# Patient Record
Sex: FEMALE | Race: WHITE | NOT HISPANIC OR LATINO
[De-identification: names, ages, dates, MRNs, and addresses within clinical notes are randomized per-mention and may not be internally consistent; named-entity substitution may affect disease eponyms.]

---

## 2017-06-26 PROBLEM — Z00.00 ENCOUNTER FOR PREVENTIVE HEALTH EXAMINATION: Status: ACTIVE | Noted: 2017-06-26

## 2018-01-09 ENCOUNTER — TRANSCRIPTION ENCOUNTER (OUTPATIENT)
Age: 63
End: 2018-01-09

## 2018-01-16 ENCOUNTER — APPOINTMENT (OUTPATIENT)
Dept: PLASTIC SURGERY | Facility: CLINIC | Age: 63
End: 2018-01-16
Payer: COMMERCIAL

## 2018-01-16 VITALS — HEIGHT: 69 IN | BODY MASS INDEX: 26.66 KG/M2 | WEIGHT: 180 LBS

## 2018-01-16 DIAGNOSIS — Z78.9 OTHER SPECIFIED HEALTH STATUS: ICD-10-CM

## 2018-01-16 DIAGNOSIS — Z72.0 TOBACCO USE: ICD-10-CM

## 2018-01-16 DIAGNOSIS — Z85.850 PERSONAL HISTORY OF MALIGNANT NEOPLASM OF THYROID: ICD-10-CM

## 2018-01-16 PROCEDURE — 99203 OFFICE O/P NEW LOW 30 MIN: CPT

## 2018-01-18 ENCOUNTER — OUTPATIENT (OUTPATIENT)
Dept: OUTPATIENT SERVICES | Facility: HOSPITAL | Age: 63
LOS: 1 days | Discharge: HOME | End: 2018-01-18

## 2018-01-18 DIAGNOSIS — Z01.818 ENCOUNTER FOR OTHER PREPROCEDURAL EXAMINATION: ICD-10-CM

## 2018-01-18 DIAGNOSIS — L72.3 SEBACEOUS CYST: ICD-10-CM

## 2018-01-22 ENCOUNTER — APPOINTMENT (OUTPATIENT)
Dept: PLASTIC SURGERY | Facility: AMBULATORY SURGERY CENTER | Age: 63
End: 2018-01-22
Payer: COMMERCIAL

## 2018-01-22 ENCOUNTER — OUTPATIENT (OUTPATIENT)
Dept: OUTPATIENT SERVICES | Facility: HOSPITAL | Age: 63
LOS: 1 days | Discharge: HOME | End: 2018-01-22

## 2018-01-22 PROCEDURE — 14040 TIS TRNFR F/C/C/M/N/A/G/H/F: CPT

## 2018-01-24 DIAGNOSIS — L72.3 SEBACEOUS CYST: ICD-10-CM

## 2018-01-30 ENCOUNTER — APPOINTMENT (OUTPATIENT)
Dept: PLASTIC SURGERY | Facility: CLINIC | Age: 63
End: 2018-01-30
Payer: COMMERCIAL

## 2018-01-30 PROCEDURE — 99024 POSTOP FOLLOW-UP VISIT: CPT

## 2018-01-30 RX ORDER — LEVOTHYROXINE SODIUM 125 UG/1
125 TABLET ORAL
Qty: 90 | Refills: 0 | Status: ACTIVE | COMMUNITY
Start: 2018-01-20

## 2018-04-29 ENCOUNTER — EMERGENCY (EMERGENCY)
Facility: HOSPITAL | Age: 63
LOS: 0 days | Discharge: HOME | End: 2018-04-29
Attending: EMERGENCY MEDICINE | Admitting: EMERGENCY MEDICINE

## 2018-04-29 VITALS
RESPIRATION RATE: 18 BRPM | DIASTOLIC BLOOD PRESSURE: 69 MMHG | TEMPERATURE: 99 F | OXYGEN SATURATION: 95 % | SYSTOLIC BLOOD PRESSURE: 154 MMHG | HEART RATE: 86 BPM

## 2018-04-29 DIAGNOSIS — S52.92XA UNSPECIFIED FRACTURE OF LEFT FOREARM, INITIAL ENCOUNTER FOR CLOSED FRACTURE: ICD-10-CM

## 2018-04-29 DIAGNOSIS — S01.81XA LACERATION WITHOUT FOREIGN BODY OF OTHER PART OF HEAD, INITIAL ENCOUNTER: ICD-10-CM
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
## 2018-04-29 DIAGNOSIS — S09.90XA UNSPECIFIED INJURY OF HEAD, INITIAL ENCOUNTER: ICD-10-CM
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
## 2018-04-29 DIAGNOSIS — Z23 ENCOUNTER FOR IMMUNIZATION: ICD-10-CM

## 2018-04-29 DIAGNOSIS — E78.5 HYPERLIPIDEMIA, UNSPECIFIED: ICD-10-CM

## 2018-04-29 DIAGNOSIS — M25.532 PAIN IN LEFT WRIST: ICD-10-CM
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
## 2018-04-29 RX ORDER — TETANUS TOXOID, REDUCED DIPHTHERIA TOXOID AND ACELLULAR PERTUSSIS VACCINE, ADSORBED 5; 2.5; 8; 8; 2.5 [IU]/.5ML; [IU]/.5ML; UG/.5ML; UG/.5ML; UG/.5ML
0.5 SUSPENSION INTRAMUSCULAR ONCE
Qty: 0 | Refills: 0 | Status: COMPLETED | OUTPATIENT
Start: 2018-04-29 | End: 2018-04-29

## 2018-04-29 RX ORDER — ACETAMINOPHEN 500 MG
650 TABLET ORAL ONCE
Qty: 0 | Refills: 0 | Status: COMPLETED | OUTPATIENT
Start: 2018-04-29 | End: 2018-04-29

## 2018-04-29 RX ADMIN — Medication 650 MILLIGRAM(S): at 09:44

## 2018-04-29 RX ADMIN — TETANUS TOXOID, REDUCED DIPHTHERIA TOXOID AND ACELLULAR PERTUSSIS VACCINE, ADSORBED 0.5 MILLILITER(S): 5; 2.5; 8; 8; 2.5 SUSPENSION INTRAMUSCULAR at 10:44

## 2018-04-29 NOTE — ED PROVIDER NOTE - CARE PLAN
Principal Discharge DX:	Closed head injury  Secondary Diagnosis:	Forehead laceration  Secondary Diagnosis:	Radius fracture

## 2018-04-29 NOTE — ED PROVIDER NOTE - OBJECTIVE STATEMENT
63 y/o F, PMHx Hypothyroidism, presents to the ED s/p mechanical fall at 2000 last night. Patient states that she tripped and fell in bathroom - sustaining a laceration to her forehead and pain/swelling to her left wrist/forearm. She denies any LOC and was able to stand up on her own following fall. She has been ambulating without difficulty. She denies any numbness/tingling to upper extremity, back pain, neck pain, chest pain, dyspnea, abdominal pain, hip pain, nausea, vomiting and additional injuries. She denies any blood thinner use.

## 2018-04-29 NOTE — ED PROCEDURE NOTE - CPROC ED POST PROC CARE GUIDE1
Instructed patient/caregiver regarding signs and symptoms of infection./Keep the cast/splint/dressing clean and dry./Instructed patient/caregiver to follow-up with primary care physician./Verbal/written post procedure instructions were given to patient/caregiver.
Verbal/written post procedure instructions were given to patient/caregiver./Elevate the injured extremity as instructed./Keep the cast/splint/dressing clean and dry./Instructed patient/caregiver to follow-up with primary care physician./Instructed patient/caregiver regarding signs and symptoms of infection.

## 2018-04-29 NOTE — ED PROVIDER NOTE - MUSCULOSKELETAL, MLM
+ swelling and tenderness along left wrist and forearm with full ROM exhibited to shoulders/elbows/hips/knees l Spine appears normal

## 2018-04-29 NOTE — ED ADULT NURSE NOTE - OBJECTIVE STATEMENT
Pt states she fell this AM in bathroom, slipped and fell forward, head lac to forehead from hitting into edge of counter and left wrist swelling and tenderness, denies LOC lv nausea/ vomitting.

## 2018-04-29 NOTE — ED PROVIDER NOTE - ATTENDING CONTRIBUTION TO CARE
Pt tripped and fell in bathroom with injury to the forehead and left wrist. No LOC. on exam laceration to the forehead and left arm swelling. Td, Ct scan neg, splint applied. Ortho follow up.

## 2018-04-29 NOTE — ED PROVIDER NOTE - SKIN, MLM
+ vertical forehead laceration with minimal active bleeding ; + ecchymoses along left forearm; + abrasion along left hand and along right forearm ; otherwise skin normal color for race, warm, dry and intact. No evidence of rash.

## 2018-04-29 NOTE — ED PROCEDURE NOTE - PROCEDURE
<<-----Click on this checkbox to enter Procedure Laceration repair of face  04/29/2018    Active  [email or web address]  Splint application  04/29/2018  left sugar tong  Active  [email or web address]

## 2018-05-05 ENCOUNTER — EMERGENCY (EMERGENCY)
Facility: HOSPITAL | Age: 63
LOS: 0 days | Discharge: HOME | End: 2018-05-05
Admitting: EMERGENCY MEDICINE

## 2018-05-05 VITALS
SYSTOLIC BLOOD PRESSURE: 136 MMHG | OXYGEN SATURATION: 97 % | TEMPERATURE: 97 F | RESPIRATION RATE: 18 BRPM | DIASTOLIC BLOOD PRESSURE: 62 MMHG | HEART RATE: 63 BPM
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
## 2018-05-05 DIAGNOSIS — S01.81XD LACERATION WITHOUT FOREIGN BODY OF OTHER PART OF HEAD, SUBSEQUENT ENCOUNTER: ICD-10-CM

## 2018-05-05 NOTE — ED PROVIDER NOTE - OBJECTIVE STATEMENT
63 y/o female presents to the ED for suture removal s/p fall 6 days ago. Patient offers no complaints. no fever/ chills

## 2018-05-05 NOTE — ED ADULT NURSE NOTE - OBJECTIVE STATEMENT
Patient states that she is here for suture removal to forehead, patient has no complaints , denies any fever, chills, swelling, or pain at suture site

## 2018-08-17 ENCOUNTER — OUTPATIENT (OUTPATIENT)
Dept: OUTPATIENT SERVICES | Facility: HOSPITAL | Age: 63
LOS: 1 days | Discharge: HOME | End: 2018-08-17

## 2018-08-17 DIAGNOSIS — Z00.00 ENCOUNTER FOR GENERAL ADULT MEDICAL EXAMINATION WITHOUT ABNORMAL FINDINGS: ICD-10-CM

## 2018-08-17 DIAGNOSIS — E89.0 POSTPROCEDURAL HYPOTHYROIDISM: ICD-10-CM

## 2019-03-21 ENCOUNTER — OUTPATIENT (OUTPATIENT)
Dept: OUTPATIENT SERVICES | Facility: HOSPITAL | Age: 64
LOS: 1 days | Discharge: HOME | End: 2019-03-21

## 2019-03-27 DIAGNOSIS — H02.884 MEIBOMIAN GLAND DYSFUNCTION LEFT UPPER EYELID: ICD-10-CM

## 2019-03-27 DIAGNOSIS — H40.013 OPEN ANGLE WITH BORDERLINE FINDINGS, LOW RISK, BILATERAL: ICD-10-CM

## 2019-03-27 DIAGNOSIS — H02.881 MEIBOMIAN GLAND DYSFUNCTION RIGHT UPPER EYELID: ICD-10-CM

## 2019-03-27 DIAGNOSIS — H16.143 PUNCTATE KERATITIS, BILATERAL: ICD-10-CM

## 2019-09-20 ENCOUNTER — OUTPATIENT (OUTPATIENT)
Dept: OUTPATIENT SERVICES | Facility: HOSPITAL | Age: 64
LOS: 1 days | Discharge: HOME | End: 2019-09-20

## 2019-09-20 DIAGNOSIS — E89.0 POSTPROCEDURAL HYPOTHYROIDISM: ICD-10-CM

## 2019-09-20 DIAGNOSIS — Z00.00 ENCOUNTER FOR GENERAL ADULT MEDICAL EXAMINATION WITHOUT ABNORMAL FINDINGS: ICD-10-CM

## 2019-09-20 DIAGNOSIS — E55.9 VITAMIN D DEFICIENCY, UNSPECIFIED: ICD-10-CM

## 2022-04-11 NOTE — ED PROCEDURE NOTE - CPROC ED WOUND CLOSURE1
skin suture Closure 3 Information: This tab is for additional flaps and grafts above and beyond our usual structured repairs.  Please note if you enter information here it will not currently bill and you will need to add the billing information manually.

## 2023-01-10 ENCOUNTER — OUTPATIENT (OUTPATIENT)
Dept: OUTPATIENT SERVICES | Facility: HOSPITAL | Age: 68
LOS: 1 days | Discharge: HOME | End: 2023-01-10

## 2023-01-10 ENCOUNTER — APPOINTMENT (OUTPATIENT)
Dept: OPHTHALMOLOGY | Facility: CLINIC | Age: 68
End: 2023-01-10
Payer: MEDICARE

## 2023-01-10 PROCEDURE — 92002 INTRM OPH EXAM NEW PATIENT: CPT

## 2023-01-10 PROCEDURE — 92133 CPTRZD OPH DX IMG PST SGM ON: CPT | Mod: 26

## 2023-01-12 DIAGNOSIS — H51.11 CONVERGENCE INSUFFICIENCY: ICD-10-CM

## 2023-01-12 DIAGNOSIS — H16.143 PUNCTATE KERATITIS, BILATERAL: ICD-10-CM

## 2023-01-12 DIAGNOSIS — H40.013 OPEN ANGLE WITH BORDERLINE FINDINGS, LOW RISK, BILATERAL: ICD-10-CM

## 2023-01-12 DIAGNOSIS — H04.123 DRY EYE SYNDROME OF BILATERAL LACRIMAL GLANDS: ICD-10-CM

## 2023-08-17 ENCOUNTER — NON-APPOINTMENT (OUTPATIENT)
Age: 68
End: 2023-08-17

## 2024-01-24 ENCOUNTER — OUTPATIENT (OUTPATIENT)
Dept: OUTPATIENT SERVICES | Facility: HOSPITAL | Age: 69
LOS: 1 days | End: 2024-01-24
Payer: MEDICARE

## 2024-01-24 ENCOUNTER — APPOINTMENT (OUTPATIENT)
Dept: OPHTHALMOLOGY | Facility: CLINIC | Age: 69
End: 2024-01-24
Payer: MEDICARE

## 2024-01-24 ENCOUNTER — TRANSCRIPTION ENCOUNTER (OUTPATIENT)
Age: 69
End: 2024-01-24

## 2024-01-24 DIAGNOSIS — H40.013 OPEN ANGLE WITH BORDERLINE FINDINGS, LOW RISK, BILATERAL: ICD-10-CM

## 2024-01-24 DIAGNOSIS — H18.523 EPITHELIAL (JUVENILE) CORNEAL DYSTROPHY, BILATERAL: ICD-10-CM

## 2024-01-24 DIAGNOSIS — H53.8 OTHER VISUAL DISTURBANCES: ICD-10-CM

## 2024-01-24 DIAGNOSIS — H04.123 DRY EYE SYNDROME OF BILATERAL LACRIMAL GLANDS: ICD-10-CM

## 2024-01-24 PROCEDURE — 92133 CPTRZD OPH DX IMG PST SGM ON: CPT | Mod: 26

## 2024-01-24 PROCEDURE — 92012 INTRM OPH EXAM EST PATIENT: CPT

## 2025-02-17 NOTE — ED PROVIDER NOTE - CHIEF COMPLAINT
Physical Therapy Evaluation    Visit Type: Initial Evaluation- Daily Treatment Note  Visit: 1  Referring Provider: Chencho Moreno MD  Medical Diagnosis (from order): R35.0 - Urine frequency  M54.42, G89.29 - Chronic bilateral low back pain with left-sided sciatica  K59.00 - Constipation, unspecified constipation type   Treatment Diagnosis: pelvic health - increased pain/symptoms, impaired range of motion, impaired motor function/performance/coordination, impaired bladder health, impaired strength, impaired activity tolerance, impaired coordination, impaired bowel health, impaired tissue mobility, impaired muscle length/flexibility, impaired posture and impaired body mechanics.  Onset  - Date of onset: about a year  Chart reviewed at time of initial evaluation (relevant co-morbidities, allergies, tests and medications listed):   Medical History: low back pain with left sciatica, ADHD, anemia, anxiety, GERD, tubal ligation, PMDD, tobacco dependence    Surgical History:  section, tubal ligation,      Obstetric History:  section and vaginal birth after      Medications:  DULoxetine (Cymbalta) 30 MG capsule  pregabalin (LYRICA) 75 MG capsule  lisdexamfetamine (Vyvanse) 70 MG capsule  hydroCORTisone (CORTIZONE) 2.5 % cream  DULoxetine (CYMBALTA) 30 MG capsule  betamethasone valerate (VALISONE) 0.1 % cream  fluticasone (CUTIVATE) 0.05 % cream  Humira, 2 Pen, 40 MG/0.4ML citrate free  Humira, 2 Pen, 40 MG/0.4ML citrate free  HYDROcodone-acetaminophen (NORCO) 5-325 MG per tablet  Cholecalciferol (Vitamin D) 125 MCG (5000 UT) Cap  chlorzoxazone (PARAFON FORTE) 500 MG tablet  lidocaine (LIDODERM) 5 % patch  Cyanocobalamin (VITAMIN B12 PO)  Ascorbic Acid (vitamin C) 1000 MG tablet  B Complex Vitamins (vitamin B complex) tablet  tacrolimus (PROTOPIC) 0.1 % ointment  triamcinolone (ARISTOCORT) 0.1 % cream  clobetasol (TEMOVATE) 0.05 % cream        SUBJECTIVE                                                                                                                Patient presents with urinary urgency, urinary incontinence, nocturia, constipation, and incomplete bladder emptying.    She will notice pressure/heaviness in her groin. Patietn states she was told she has bladder thickening. She has a lot of numbness from her waist down.     Pain / Symptoms  - Location: Bilateral hip pain and some leg numbness  - Quality / Description: ache, discomfort, pressure, numbness        Bladder: frequency, leakage on way to restroom, just in case toileting, nocturia, leakage with activities, difficulty delaying urgency, stuttering stream, sense of incomplete emptying, difficulty initiating stream and urgency (can control urge less than 5 min)        Bowel: constipation, abdominal pain, straining and difficulty evacuating stool     - Frequency of urination daytime: very infrequently during day   Frequency of nighttime urination: at least 3-4 times  Fluids: cup of coffee, 1-2 apple juice, -1-2 simply fruit punch, brisk iced tea, water 2-3 16oz bottles,     Bowels:   Frequency of bowel movements: average 2-3 times per week  Pushing and straining, pain at rectum after bowel movement, incomplete emptying noted, sitting on the toilet for 2 minutes. Used to use psyllium fiber and magnesium but did not help much.   - Alleviating Factors: unable to state anything that helps reduce the pain    Function:   Limitations / Exacerbation Factors:   - Patient reports difficulty and increased time with function reported below.  - sleep disturbed, mental health impact, disruption in ADLs/IADLs due to restroom use, coughing/laughing/sneezing, limited community/social interactions, limited ability to be away from restroom, restriction in fluid intake and need to wear a pad  Prior Level of Function: pain free ADLs and IADLs. no limitation in involved area. no bladder symptoms. no bowel symptoms,    Patient Goals: increased motion, decrease  symptoms, increased strength, decrease toileting frequency, decrease pad usage, decrease leakage, able to delay toileting, improved sleep, decreased pain, increased frequency of bowel movements and improved evacuation.    Prior treatment  - no therapies  - Discharged from hospital, home health, or skilled nursing facility in last 30 days: no  Home Environment   - Patient lives with: significant other  - Assistance available: consistent  - Denies 2 or more falls or an unexplained fall with injury in the last year.  - Feel safe at home / work / school: yes      OBJECTIVE                                                                                                                                Outcome/Assessments  Patient Specific Functional Scale:   Activity: Increase frequency of bowel movements. , Score: 0  Activity: Reduce urinary incontinence with walking/sneezing/coughing. , Score: 0  Activity: Reduce nocturia. , Score: 0  Average Score: 0  Each activity is scored: 0=unable to perform activity to 10=able to perform activity at the same level as before injury or problem      Treatment     Activities of Daily Living/Self Care  Patient educated on pelvis anatomy and physiology as it relates to medical diagnosis and findings from physical therapy evaluation. Visual aides utilized for education and patient provided with written materials for review. Patient educated on role of urgency on bladder control and the use of techniques to reduce urgency, eliminate maladaptive behaviors and improve control.     Urgency control techniques:              When urge occurs, stop and sit if possible             Relax in chair, relax muscles             Perform 3-4 pelvic floor contractions or a sustained submaximal contraction             Distract self from urgency.             When urgency is calmed, perform pelvic floor contraction before standing and slowly proceed to the bathroom.      Instruction in double voiding  strategies to improve urine output and reduce sensation of incomplete bladder emptying.     Instructed in strategies to prevent / reduce constipation for bowel health:  Instruction and education on proper toileting mechanics/posture to include:   Keep the normal curve in your low back   Elevate knees higher than you hips to help relax the pelvic floor muscels   Relax your whole body   Take slow, gentle belly breaths.    Think \"drop my pelivc floor muscles\"    \"Big Belly, Hard Belly\"    1. Assume proper sitting position  2, Inhale, allowing breath to expand you belly (Big Belly)  3. Keep belly big, but assume normal breathing  4. Activate abdominal muscles, hard belly, to keep belly big  5. Gently bulge you pelvic floor muscles as you exhale to expel your stool  6. Never strain or hold breath    Abdominal massage to improve stool motility. Written handout and instruction with demonstration provided.       Skilled input: as detailed above, demonstration and verbal instruction/cues    Writer verbally educated and received verbal consent for hand placement, positioning of patient, and techniques to be performed today from patient for clothing adjustments for techniques, hand placement and palpation for techniques and therapist position for techniques as described above and how they are pertinent to the patient's plan of care.  Patient provided continued consent during evaluation and treatment.  Patient was given alternative options.  Benefits and drawbacks were explained.  Third person was offered to be in room during session, patient declined.  The following services were declined: internal exam, external exam and external visualization. Reason: hold until next visit  Home Exercise Program  Relaxation technique for urge control.  Double voiding  Abdominal massage  Toilet posture  Big belly hard belly       ASSESSMENT                                                                                                           45 year old patient has reported functional limitations listed above impacted by signs and symptoms consistent with treatment diagnosis below.  Treatment Diagnosis:   - Involved: pelvic health.  - Symptoms/impairments: increased pain/symptoms, impaired range of motion, impaired motor function/performance/coordination, impaired bladder health, impaired strength, impaired activity tolerance, impaired coordination, impaired bowel health, impaired tissue mobility, impaired muscle length/flexibility, impaired posture and impaired body mechanics.    Prognosis: Patient will benefit from skilled therapy.  Rehabilitative potential is: good.  Predicted patient presentation: Moderate (evolving) - Patient comorbidities and complexities, as defined above, may have varying impact on steady progress for prescribed plan of care.  Education:   - Present and ready to learn: patient  - Results of above outlined education: Verbalizes understanding, Demonstrates understanding and Needs reinforcement    PLAN                                                                                                                         The following skilled interventions to be implemented to achieve goals listed below:  Neuromuscular Re-Education (68334)  Therapeutic Activity (03043)  Therapeutic Exercise (54317)  Manual Therapy (69536)  Gait Training (85890)  Dry Needling  Electrical Stimulation Unattended (61341 or )  Activities of Daily Living/Self Care (69181)  Biofeedback (82944/15459/11035)    Frequency / Duration  1 times per week tapering as patient progresses for 12 weeks for an estimated total of 12 visits    Patient involved in and agreed to plan of care and goals.  Patient has been given attendance policy at time of initial evaluation.    Suggestions for next session as indicated: Progress per plan of care, internal examination, glute assessment/motor sequencing, scar mobilization    Goals  Long Term Goals: to be met by end of  The patient is a 62y Female complaining of fall. plan of care  1. Patient will cough/sneeze/laugh 90% of the time without urinary incontinence.     2. Patient will control urinary urge for 10-15 minutes to successfully walk to bathroom in control, without leakage.    3. Patient will improve voiding frequency to urinating every 2-4 hours in order to participate in community and social activities with limited restroom access.    4. Patient will improve bowel movement frequency to every 1-3 days with no more than 1/10 pain.    5. Patient Specific Functional Scale (PSFS) will improve an average score 3/10 (minimal detectable change (90%CI) for average score is 2 points, for single activity score is 3 points)  6. Patient will demonstrate at least 3/5 pelvic floor strength with ability to fully relax and elongate for normal bladder and bowel habits.       Therapy procedure time and total treatment time can be found documented on the Time Entry flowsheet